# Patient Record
Sex: MALE | Race: WHITE | Employment: FULL TIME | URBAN - METROPOLITAN AREA
[De-identification: names, ages, dates, MRNs, and addresses within clinical notes are randomized per-mention and may not be internally consistent; named-entity substitution may affect disease eponyms.]

---

## 2019-12-28 ENCOUNTER — OFFICE VISIT (OUTPATIENT)
Dept: URGENT CARE | Facility: URGENT CARE | Age: 39
End: 2019-12-28
Payer: COMMERCIAL

## 2019-12-28 VITALS
TEMPERATURE: 98.5 F | HEART RATE: 68 BPM | WEIGHT: 290 LBS | SYSTOLIC BLOOD PRESSURE: 120 MMHG | BODY MASS INDEX: 38.43 KG/M2 | DIASTOLIC BLOOD PRESSURE: 72 MMHG | HEIGHT: 73 IN | OXYGEN SATURATION: 98 % | RESPIRATION RATE: 16 BRPM

## 2019-12-28 DIAGNOSIS — H01.002 BLEPHARITIS OF RIGHT LOWER EYELID, UNSPECIFIED TYPE: Primary | ICD-10-CM

## 2019-12-28 PROCEDURE — 99204 OFFICE O/P NEW MOD 45 MIN: CPT | Performed by: PHYSICIAN ASSISTANT

## 2019-12-28 RX ORDER — ERYTHROMYCIN 5 MG/G
OINTMENT OPHTHALMIC
Qty: 3.5 G | Refills: 0 | Status: SHIPPED | OUTPATIENT
Start: 2019-12-28

## 2019-12-28 RX ORDER — NEOMYCIN POLYMYXIN B SULFATES AND DEXAMETHASONE 3.5; 10000; 1 MG/ML; [USP'U]/ML; MG/ML
1 SUSPENSION/ DROPS OPHTHALMIC 3 TIMES DAILY
Qty: 5 ML | Refills: 0 | Status: SHIPPED | OUTPATIENT
Start: 2019-12-28 | End: 2020-01-04

## 2019-12-28 ASSESSMENT — ENCOUNTER SYMPTOMS
EYE REDNESS: 0
DIARRHEA: 0
HEADACHES: 0
SHORTNESS OF BREATH: 0
FEVER: 0
SORE THROAT: 0
JOINT SWELLING: 0
BRUISES/BLEEDS EASILY: 0
CONFUSION: 0
NAUSEA: 0
VOMITING: 0

## 2019-12-28 ASSESSMENT — MIFFLIN-ST. JEOR: SCORE: 2279.31

## 2019-12-28 NOTE — PATIENT INSTRUCTIONS
Patient Education     Blepharitis    Blepharitis is an inflammation of the eyelid. It results in swelling of the eyelids, and it is often caused by a bacterial infection or a skin condition. Blepharitis is a common eye condition. There are two types. Anterior blepharitis occurs where the eyelashes are attached (outside front edge of the eyelid). Posterior blepharitis affects the inner edge of the eyelid that touches the eyeball.  In addition to swollen eyelids, blepharitis symptoms can include thick, yellow, dandruff-like scales that stick to the eyelid. There may be oily patches on the eyelid. The eyelashes may be crusted (with dandruff-like scales) when you wake up from sleeping. The irritated area may itch. The eyelids may be red. The eyes can be red and burn or sting. The eyes may tear a lot, or be dry. You can become sensitive to light or have blurred vision. Symptoms of blepharitis can cause irritability.  Blepharitis is a chronic condition and hard to cure. Even with successful treatment, recurrences are common. Good hygiene and home treatments (in the Home care section below) can improve your condition.  Causes  Causes of blepharitis may include:    Problems with the oil glands in the eyelid (meibomian glands)    Dandruff of the scalp and eyebrows (seborrheic dermatitis)    Acne rosacea (a skin condition that causes redness of the face, and other symptoms)    Eyelash mites (tiny organisms in the eyelash follicles)    Allergic reactions to cosmetics or medicines  Home care  Medicine: The healthcare provider may prescribe an antibiotic eye drops or ointment, artificial tears, and/or steroid eye drops. Follow all instructions for using these medicines. Use all medicines as directed. If you have pain, take medicine as advised by the healthcare provider.    Wash your hands carefully with soap and warm water before and after caring for your eyes.    Apply a warm compress or a warm, moist washcloth to the eyelids  for 1 minute, 2 to 3 times a day, to loosen the crust. Then, wipe away scales or crust from the eyelids.    After applying the warm compress, gently scrub the base of the eyelashes for almost 15 seconds per eyelid. To do this, close your eyes and use a moist eyelid cleansing wipe, clean washcloth, or cotton swab. Ask your healthcare provider about products (such as gentle baby shampoo) to use to help clean the eyelids.    You may be instructed to gently massage your eyelids to help unblock the eyelid glands. Follow all instructions given by the healthcare provider.    Unless told otherwise, on a regular basis, with eyes closed, clean your eyelids as directed by the healthcare provider. Blepharitis can be an ongoing problem.    Don't wear eye makeup until the inflammation goes away, or as directed by your healthcare provider.    Unless told otherwise, stop using contact lenses until you complete treatment for the condition.    Wash your hands regularly to help prevent dirt and bacteria from coming in contact with your eyelid.  Follow-up care  Follow up with your healthcare provider, or as advised. Your healthcare provider may refer you to an eye specialist (an optometrist or ophthalmologist) for further evaluation and treatment.  When to seek medical advice  Call your healthcare provider right away if any of these occur:    Increase in redness of the white part of the eye    Increase in swelling, redness, irritation, or pain of the eyelids    Eye pain    Change in vision (trouble seeing or blurring)    Drainage (pus, blood) from the eyelid    Fever of 100.4 F (38 C) or higher, or as directed by your healthcare provider  Date Last Reviewed: 3/1/2018    6852-5991 [employer]. [address] [place] [address], [place] [address]. All rights reserved. This information is not intended as a substitute for professional medical care. Always follow your healthcare professional's instructions.

## 2019-12-28 NOTE — PROGRESS NOTES
SUBJECTIVE:   Mohinder Mccloud is a 39 year old male presenting with a chief complaint of   Chief Complaint   Patient presents with     Eye Problem     right eye, pain, redenss ,swelling x last night       He is a new patient of [facility].    Eye Problem    Onset of symptoms was 1 day(s) ago.   Location: right eye   Course of illness is worsening.    Severity moderate  Current and Associated symptoms: eyelid redness, swelling and pain noted. No lump noted.  No visual changes.  Treatment measures tried include none  Context: Contact lens use  No eye injury or trauma.      Review of Systems   Constitutional: Negative for fever.   HENT: Negative for sore throat.    Eyes: Negative for redness and visual disturbance.        Right lower eyelid swelling, redness and pain   Respiratory: Negative for shortness of breath.    Cardiovascular: Negative for chest pain.   Gastrointestinal: Negative for diarrhea, nausea and vomiting.   Musculoskeletal: Negative for joint swelling.   Skin: Negative for rash.   Allergic/Immunologic: Negative for immunocompromised state.   Neurological: Negative for headaches.   Hematological: Does not bruise/bleed easily.   Psychiatric/Behavioral: Negative for confusion.       History reviewed. No pertinent past medical history.  History reviewed. No pertinent family history.  Current Outpatient Medications   Medication Sig Dispense Refill     erythromycin (ROMYCIN) 5 MG/GM ophthalmic ointment Apply one quarter inch to affected eye 3 times daily x 1 week 3.5 g 0     neomycin-polymixin-dexamethasone (MAXITROL) 0.1 % ophthalmic suspension Place 1 drop into the right eye 3 times daily for 7 days 5 mL 0     Social History     Tobacco Use     Smoking status: Never Smoker     Smokeless tobacco: Never Used   Substance Use Topics     Alcohol use: Yes       OBJECTIVE  /72 (BP Location: Right arm, Patient Position: Chair, Cuff Size: Adult Large)   Pulse 68   Temp 98.5  F (36.9  C) (Oral)   Resp 16   Ht  "1.854 m (6' 1\")   Wt 131.5 kg (290 lb)   SpO2 98%   BMI 38.26 kg/m      Physical Exam  Vitals signs and nursing note reviewed.   Constitutional:       General: He is not in acute distress.     Appearance: He is well-developed.   HENT:      Head: Normocephalic and atraumatic.      Right Ear: External ear normal.      Left Ear: External ear normal.      Mouth/Throat:      Mouth: Mucous membranes are moist.   Eyes:      General:         Right eye: No discharge.         Left eye: No discharge.      Extraocular Movements: Extraocular movements intact.      Pupils: Pupils are equal, round, and reactive to light.      Comments: Right eye exam: Right conjunctiva is injected.  Tenderness to palpation right lower eyelid.  Mild swelling noted. No lump noted.   Neck:      Musculoskeletal: Normal range of motion.   Pulmonary:      Effort: Pulmonary effort is normal. No respiratory distress.   Skin:     General: Skin is warm and dry.   Neurological:      Mental Status: He is alert.   Psychiatric:         Mood and Affect: Mood normal.         Labs:  No results found for this or any previous visit (from the past 24 hour(s)).        ASSESSMENT:      ICD-10-CM    1. Blepharitis of right lower eyelid, unspecified type H01.002 erythromycin (ROMYCIN) 5 MG/GM ophthalmic ointment     neomycin-polymixin-dexamethasone (MAXITROL) 0.1 % ophthalmic suspension          PLAN:    Blepharitis right lower eyelid: Erythromycin ophthalmic ointment  and Maxitrol ophthalmic suspension are prescribed.  Recommended warm compresses over the affected eye.  Follow-up if any worsening symptoms.  Patient agrees.     Followup:    If not improving or if condition worsens, follow up with your Primary Care Provider    Patient Instructions     Patient Education     Blepharitis    Blepharitis is an inflammation of the eyelid. It results in swelling of the eyelids, and it is often caused by a bacterial infection or a skin condition. Blepharitis is a common eye " condition. There are two types. Anterior blepharitis occurs where the eyelashes are attached (outside front edge of the eyelid). Posterior blepharitis affects the inner edge of the eyelid that touches the eyeball.  In addition to swollen eyelids, blepharitis symptoms can include thick, yellow, dandruff-like scales that stick to the eyelid. There may be oily patches on the eyelid. The eyelashes may be crusted (with dandruff-like scales) when you wake up from sleeping. The irritated area may itch. The eyelids may be red. The eyes can be red and burn or sting. The eyes may tear a lot, or be dry. You can become sensitive to light or have blurred vision. Symptoms of blepharitis can cause irritability.  Blepharitis is a chronic condition and hard to cure. Even with successful treatment, recurrences are common. Good hygiene and home treatments (in the Home care section below) can improve your condition.  Causes  Causes of blepharitis may include:    Problems with the oil glands in the eyelid (meibomian glands)    Dandruff of the scalp and eyebrows (seborrheic dermatitis)    Acne rosacea (a skin condition that causes redness of the face, and other symptoms)    Eyelash mites (tiny organisms in the eyelash follicles)    Allergic reactions to cosmetics or medicines  Home care  Medicine: The healthcare provider may prescribe an antibiotic eye drops or ointment, artificial tears, and/or steroid eye drops. Follow all instructions for using these medicines. Use all medicines as directed. If you have pain, take medicine as advised by the healthcare provider.    Wash your hands carefully with soap and warm water before and after caring for your eyes.    Apply a warm compress or a warm, moist washcloth to the eyelids for 1 minute, 2 to 3 times a day, to loosen the crust. Then, wipe away scales or crust from the eyelids.    After applying the warm compress, gently scrub the base of the eyelashes for almost 15 seconds per eyelid. To do  this, close your eyes and use a moist eyelid cleansing wipe, clean washcloth, or cotton swab. Ask your healthcare provider about products (such as gentle baby shampoo) to use to help clean the eyelids.    You may be instructed to gently massage your eyelids to help unblock the eyelid glands. Follow all instructions given by the healthcare provider.    Unless told otherwise, on a regular basis, with eyes closed, clean your eyelids as directed by the healthcare provider. Blepharitis can be an ongoing problem.    Don't wear eye makeup until the inflammation goes away, or as directed by your healthcare provider.    Unless told otherwise, stop using contact lenses until you complete treatment for the condition.    Wash your hands regularly to help prevent dirt and bacteria from coming in contact with your eyelid.  Follow-up care  Follow up with your healthcare provider, or as advised. Your healthcare provider may refer you to an eye specialist (an optometrist or ophthalmologist) for further evaluation and treatment.  When to seek medical advice  Call your healthcare provider right away if any of these occur:    Increase in redness of the white part of the eye    Increase in swelling, redness, irritation, or pain of the eyelids    Eye pain    Change in vision (trouble seeing or blurring)    Drainage (pus, blood) from the eyelid    Fever of 100.4 F (38 C) or higher, or as directed by your healthcare provider  Date Last Reviewed: 3/1/2018    5530-1163 [employer]. [address] [place] [id number]. All rights reserved. This information is not intended as a substitute for professional medical care. Always follow your healthcare professional's instructions.

## 2021-06-14 ENCOUNTER — OFFICE VISIT - RIVER FALLS (OUTPATIENT)
Dept: FAMILY MEDICINE | Facility: CLINIC | Age: 41
End: 2021-06-14

## 2021-06-14 ASSESSMENT — MIFFLIN-ST. JEOR: SCORE: 2242.1

## 2022-02-12 VITALS — WEIGHT: 284 LBS | BODY MASS INDEX: 37.64 KG/M2 | HEIGHT: 73 IN

## 2022-02-16 NOTE — NURSING NOTE
Quick Intake Entered On:  6/14/2021 3:18 PM CDT    Performed On:  6/14/2021 3:18 PM CDT by Nataliya Lacy               Summary   Weight Measured :   284 lb(Converted to: 284 lb 0 oz, 128.820 kg)    Height Measured :   73 in(Converted to: 6 ft 1 in, 185.42 cm)    Body Mass Index :   37.47 kg/m2 (HI)    Body Surface Area :   2.57 m2   Nataliya Lacy - 6/14/2021 3:18 PM CDT

## 2022-02-16 NOTE — PROGRESS NOTES
"   Patient:   RAGHAVENDRA SEYMOUR            MRN: [id number]            FIN: [id number]               Age:   41 years     Sex:  Male     :  1980   Associated Diagnoses:   Obesity, unspecified   Author:   Nataliya Lacy      Visit Information   Visit type:  Medical Nutrition Therapy.    Referral source:  Jaiden GUY, Dr. Ghassan Crenshaw  [facility] .       Chief Complaint   Obesity BMI >30      Interval History   Pt would like dietary education to promote weight loss.  Pt has lost and gained weight in the past but no long term success.      Currently eating out ~80% of his meals, fast food, restaurant etc. Very low intake of fruit and vegetables.  Eating out due to being busy, habit/ routine   Activity:  for work, busy \"bad back\" [facility]  Stress/ emotion: pt does not eat for reasons of stress or emotion       Health Status   Allergies:    Allergic Reactions (Selected)  Severity Not Documented  Codeine (Itching and fever)   Problem list:    All Problems (Selected)  Other intervertebral disc displacement, lumbar region / SNOMED CT 729618352 / Confirmed  Other acne / SNOMED CT 27169286 / Confirmed  Obstructive sleep apnea (adult) (pediatric) / SNOMED CT 641710188 / Confirmed  Obesity, unspecified / SNOMED CT 1679135415 / Probable  Insomnia, unspecified / SNOMED CT 050661039 / Confirmed  Allergic rhinitis, unspecified / SNOMED CT 481375712 / Confirmed      Histories   Past Medical History:    Active  Other intervertebral disc displacement, lumbar region (050553269): Onset on 2012 at 32 years.  Obesity, unspecified (0534518604)  Other acne (30720067)  Comments:      -   Accutane x3,  Allergic rhinitis, unspecified (269797562)  Insomnia, unspecified (227091046)  Obstructive sleep apnea (adult) (pediatric) (061832807)  Resolved  Molluscum contagiosum (86647468):  Resolved.  LUMBAGO (724.2):  Resolved.  Comments:      -   Chronic Recurrent  Lumbosacral Spondylosis " without Myelopathy (721.3):  Resolved.  Sacroiliitis, Not Elsewhere Classified (720.2):  Resolved.  Muscular Wasting and Disuse Atrophy, Not Elsewhere Classified (728.2):  Resolved.  Enthesopathy of Hip Region (726.5):  Resolved.   Family History:    Diabetes mellitus  Grandfather (M)  Brain tumor  Grandfather (M)  Depression  Father (Andrew)  CVA - Cerebrovascular accident  Father (Andrew)  GERD - Gastro-esophageal reflux disease  Mother (Tyesha)     Procedure history:    Colonoscopy (SNOMED CT 087055549) on 6/8/2015 at 35 Years.  Comments:  4/5/2021 1:49 PM CDT - Stephanie Starks  Normal.  History of shoulder surgery (SNOMED CT 9012943758) in 1996 at 16 Years.  Comments:  4/5/2021 1:49 PM ANGELICT - Stephanie Starks  Stabilization of right shoulder.  Arthroscopy of knee (SNOMED CT 789318781) in 1991 at 11 Years.  Comments:  4/5/2021 1:50 PM ANGELICT - Stephanie Starks  Right.   Social History:        Alcohol Assessment: Current            1-2 times per month, 3 drinks/episode average.      Tobacco Assessment: Denies Tobacco Use            Never (less than 100 in lifetime)      Substance Abuse Assessment: Denies Substance Abuse            Never      Employment and Education Assessment            Work/School description: .      Home and Environment Assessment            Marital status: Single.  1 children.      Nutrition and Health Assessment            Type of diet: Regular.  Caffeine intake amount: 1 to 3 pop per day.      Exercise and Physical Activity Assessment            Exercise frequency: 1-2 times/week.  Exercise type: Swimming.        Physical Examination   Measurements from flowsheet : Measurements   6/14/2021 3:18 PM CDT Height Measured - Standard 73 in    Weight Measured - Standard 284 lb    BSA 2.57 m2    Body Mass Index 37.47 kg/m2  HI         Impression and Plan   Diagnosis     Obesity, unspecified (TIF19-DE E66.9).         Today patient was instructed on the importance of how nutrition and physical activity can  impact weight status and improve overall health.  We discussed why it is important to incorporate healthy lifestyle interventions to control overall health to prevent complications of being obese including the potential of developing diabetes and preventing heart disease.   We discussed the food plate method.  Patient will be starting 1750 calorie meal plan with appropriate portion sizes.  We discussed how to read food labels.  Patient is shown appropriate portion sizes of a variety of foods.   We discussed how calories in should be less than calories expended to achieve optimal weight loss.      Hunger/ Fullness cues - use the chart to help identify how pt is feeling before, during, and after eating   Smart Snacking and 20 Ways to eat more fruit and vegetables.    Mindful eating - listening to body vs eating out of stress, boredom, emotion, eating to fuel body for strength and energy   Weight loss tips  Tracking weight - daily recommended  Tracking food - patient is given reference to [employer]/ [email or web address] or apps and encouraged to track daily intake of food and fluids   Provided resources for cooking/ recipe websites  Importance of daily physical activity above work schedule to promote endorphin release    reduce stress, anxiety, and depression, improve sleep, increase energy level, improve muscle tone and strength ...  recommendation to incorporate physical activity at least 5 days per week with minimum of 30-60 minutes of moderately intense activity to promote weight loss.      Goals:  1750 calorie/ day   3 fruit/ day  2-3 vegetables/ day  Protein at each meal (protein shake option)  Record intake and daily weight       Professional Services   Time spent with pt 45 min   cc Dr. Hwang   cc Dr. Ferrell  [facility]